# Patient Record
Sex: MALE | URBAN - METROPOLITAN AREA
[De-identification: names, ages, dates, MRNs, and addresses within clinical notes are randomized per-mention and may not be internally consistent; named-entity substitution may affect disease eponyms.]

---

## 2022-04-11 ENCOUNTER — WEB ENCOUNTER (OUTPATIENT)
Dept: URBAN - METROPOLITAN AREA CLINIC 82 | Facility: CLINIC | Age: 67
End: 2022-04-11

## 2022-04-11 ENCOUNTER — OFFICE VISIT (OUTPATIENT)
Dept: URBAN - METROPOLITAN AREA CLINIC 82 | Facility: CLINIC | Age: 67
End: 2022-04-11
Payer: MEDICARE

## 2022-04-11 DIAGNOSIS — K74.60 UNSPECIFIED CIRRHOSIS OF LIVER: ICD-10-CM

## 2022-04-11 DIAGNOSIS — R18.8 OTHER ASCITES: ICD-10-CM

## 2022-04-11 DIAGNOSIS — Z95.810 CARDIAC DEFIBRILLATOR IN PLACE: ICD-10-CM

## 2022-04-11 DIAGNOSIS — Z79.01 ANTICOAGULANT LONG-TERM USE: ICD-10-CM

## 2022-04-11 PROBLEM — 711150003: Status: ACTIVE | Noted: 2022-04-11

## 2022-04-11 PROBLEM — 389026000: Status: ACTIVE | Noted: 2022-04-11

## 2022-04-11 PROBLEM — 441769002: Status: ACTIVE | Noted: 2022-04-11

## 2022-04-11 PROCEDURE — 99204 OFFICE O/P NEW MOD 45 MIN: CPT | Performed by: INTERNAL MEDICINE

## 2022-04-11 RX ORDER — CLOPIDOGREL 75 MG/1
1 TABLET TABLET, FILM COATED ORAL ONCE A DAY
Status: ACTIVE | COMMUNITY

## 2022-04-11 RX ORDER — TAMSULOSIN HYDROCHLORIDE 0.4 MG/1
1 CAPSULE CAPSULE ORAL ONCE A DAY
Status: ACTIVE | COMMUNITY

## 2022-04-11 RX ORDER — EMPAGLIFLOZIN 10 MG/1
1 TABLET TABLET, FILM COATED ORAL ONCE A DAY
Status: ACTIVE | COMMUNITY

## 2022-04-11 RX ORDER — AMIODARONE HYDROCHLORIDE 200 MG/1
1 TABLET TABLET ORAL ONCE A DAY
Status: ACTIVE | COMMUNITY

## 2022-04-11 RX ORDER — CARVEDILOL 3.12 MG/1
1 TABLET WITH FOOD TABLET, FILM COATED ORAL TWICE A DAY
Status: ACTIVE | COMMUNITY

## 2022-04-11 NOTE — HPI-TODAY'S VISIT:
reports hx cryptogenic cirrhosis, denies etoh, recently prescribed ?lasix 20 daily for ascites and abd distention. no record available or med list. reports hx colon polyp ?5y ago. recent Chest CT, NS [facility], record obtained, reviewed, sm R pleural effusion, s/p ICD, new upper abd ascites and equivocal changes of cirrhosis. had been on plavix as of 4 mo ago, and omep 20 daily.

## 2022-04-14 LAB
A/G RATIO: 1.7
ACTIN (SMOOTH MUSCLE) ANTIBODY: 13
ALBUMIN: 4.2
ALKALINE PHOSPHATASE: 137
ALPHA-1-ANTITRYPSIN, SERUM: 203
ALT (SGPT): 9
AST (SGOT): 17
BASO (ABSOLUTE): 0.1
BASOS: 1
BILIRUBIN, DIRECT: 0.5
BILIRUBIN, TOTAL: 1.5
BUN/CREATININE RATIO: 12
BUN: 13
CALCIUM: 9
CARBON DIOXIDE, TOTAL: 22
CERULOPLASMIN: 39.5
CHLORIDE: 106
CREATININE: 1.09
EGFR: 74
EOS (ABSOLUTE): 0.2
EOS: 2
FERRITIN, SERUM: 16
GLOBULIN, TOTAL: 2.5
GLUCOSE: 111
HBSAG SCREEN: NEGATIVE
HCV AB: <0.1
HEMATOCRIT: 39.2
HEMATOLOGY COMMENTS:: (no result)
HEMOGLOBIN: 10.2
HEP A AB, TOTAL: NEGATIVE
HEP B CORE AB, TOT: NEGATIVE
HEPATITIS B SURF AB QUANT: <3.1
IMMATURE CELLS: (no result)
IMMATURE GRANS (ABS): 0
IMMATURE GRANULOCYTES: 1
INR: 1.1
INTERPRETATION:: (no result)
IRON BIND.CAP.(TIBC): 483
IRON SATURATION: 5
IRON: 25
LYMPHS (ABSOLUTE): 1.1
LYMPHS: 13
MCH: 18.1
MCHC: 26
MCV: 69
MITOCHONDRIAL (M2) ANTIBODY: <20
MONOCYTES(ABSOLUTE): 0.6
MONOCYTES: 7
NEUTROPHILS (ABSOLUTE): 6.5
NEUTROPHILS: 76
NRBC: (no result)
PLATELETS: 303
POTASSIUM: 4.6
PROTEIN, TOTAL: 6.7
PROTHROMBIN TIME: 11.7
RBC: 5.65
RDW: 20.4
SODIUM: 143
UIBC: 458
WBC: 8.5

## 2022-04-18 ENCOUNTER — TELEPHONE ENCOUNTER (OUTPATIENT)
Dept: URBAN - METROPOLITAN AREA CLINIC 82 | Facility: CLINIC | Age: 67
End: 2022-04-18

## 2022-04-20 ENCOUNTER — TELEPHONE ENCOUNTER (OUTPATIENT)
Dept: URBAN - METROPOLITAN AREA CLINIC 82 | Facility: CLINIC | Age: 67
End: 2022-04-20

## 2022-04-20 ENCOUNTER — TELEPHONE ENCOUNTER (OUTPATIENT)
Dept: URBAN - METROPOLITAN AREA CLINIC 92 | Facility: CLINIC | Age: 67
End: 2022-04-20

## 2022-04-20 RX ORDER — CLOPIDOGREL 75 MG/1
1 TABLET TABLET, FILM COATED ORAL ONCE A DAY
Status: ACTIVE | COMMUNITY

## 2022-04-20 RX ORDER — CARVEDILOL 3.12 MG/1
1 TABLET WITH FOOD TABLET, FILM COATED ORAL TWICE A DAY
Status: ACTIVE | COMMUNITY

## 2022-04-20 RX ORDER — AMIODARONE HYDROCHLORIDE 200 MG/1
1 TABLET TABLET ORAL ONCE A DAY
Status: ACTIVE | COMMUNITY

## 2022-04-20 RX ORDER — MINERAL SUPPLEMENT IRON 300 MG / 5 ML STRENGTH LIQUID 100 PER BOX UNFLAVORED 300 MG/5ML
10 ML LIQUID (ML) ORAL ONCE A DAY
Qty: 300 ML | Refills: 1 | OUTPATIENT

## 2022-04-20 RX ORDER — FERRIC CARBOXYMALTOSE INJECTION 50 MG/ML
AS DIRECTED INJECTION, SOLUTION INTRAVENOUS
Qty: 2 VIAL | Refills: 0 | OUTPATIENT
Start: 2022-04-20 | End: 2022-05-04

## 2022-04-20 RX ORDER — VITAMIN A 2400 MCG
1 TABLET CAPSULE ORAL TWICE DAILY
Qty: 60 | Refills: 1 | OUTPATIENT

## 2022-04-20 RX ORDER — TAMSULOSIN HYDROCHLORIDE 0.4 MG/1
1 CAPSULE CAPSULE ORAL ONCE A DAY
Status: ACTIVE | COMMUNITY

## 2022-04-20 RX ORDER — EMPAGLIFLOZIN 10 MG/1
1 TABLET TABLET, FILM COATED ORAL ONCE A DAY
Status: ACTIVE | COMMUNITY

## 2022-04-20 RX ORDER — MINERAL SUPPLEMENT IRON 300 MG / 5 ML STRENGTH LIQUID 100 PER BOX UNFLAVORED 300 MG/5ML
10 ML LIQUID (ML) ORAL ONCE A DAY
Qty: 300 ML | Refills: 1 | Status: ACTIVE | COMMUNITY

## 2022-04-20 RX ORDER — FERRIC CARBOXYMALTOSE INJECTION 50 MG/ML
AS DIRECTED INJECTION, SOLUTION INTRAVENOUS
Qty: 2 VIAL | Refills: 0 | Status: ACTIVE | COMMUNITY
Start: 2022-04-20 | End: 2022-05-04

## 2022-04-26 PROBLEM — 19943007: Status: ACTIVE | Noted: 2022-04-11

## 2022-05-03 ENCOUNTER — TELEPHONE ENCOUNTER (OUTPATIENT)
Dept: URBAN - METROPOLITAN AREA CLINIC 82 | Facility: CLINIC | Age: 67
End: 2022-05-03

## 2022-06-10 ENCOUNTER — OFFICE VISIT (OUTPATIENT)
Dept: URBAN - METROPOLITAN AREA MEDICAL CENTER 24 | Facility: MEDICAL CENTER | Age: 67
End: 2022-06-10
Payer: MEDICARE

## 2022-06-10 DIAGNOSIS — K74.60 ADVANCED CIRRHOSIS: ICD-10-CM

## 2022-06-10 DIAGNOSIS — K29.60 ADENOPAPILLOMATOSIS GASTRICA: ICD-10-CM

## 2022-06-10 DIAGNOSIS — Z86.010 ADENOMAS PERSONAL HISTORY OF COLONIC POLYPS: ICD-10-CM

## 2022-06-10 DIAGNOSIS — D12.2 ADENOMA OF ASCENDING COLON: ICD-10-CM

## 2022-06-10 DIAGNOSIS — D50.9 ANEMIA: ICD-10-CM

## 2022-06-10 PROCEDURE — 43239 EGD BIOPSY SINGLE/MULTIPLE: CPT | Performed by: INTERNAL MEDICINE

## 2022-06-10 PROCEDURE — 45385 COLONOSCOPY W/LESION REMOVAL: CPT | Performed by: INTERNAL MEDICINE

## 2022-06-29 ENCOUNTER — OFFICE VISIT (OUTPATIENT)
Dept: URBAN - METROPOLITAN AREA CLINIC 82 | Facility: CLINIC | Age: 67
End: 2022-06-29
Payer: MEDICARE

## 2022-06-29 ENCOUNTER — OFFICE VISIT (OUTPATIENT)
Dept: URBAN - METROPOLITAN AREA CLINIC 81 | Facility: CLINIC | Age: 67
End: 2022-06-29
Payer: MEDICARE

## 2022-06-29 VITALS
SYSTOLIC BLOOD PRESSURE: 109 MMHG | WEIGHT: 194 LBS | DIASTOLIC BLOOD PRESSURE: 66 MMHG | TEMPERATURE: 97.3 F | HEART RATE: 55 BPM | HEIGHT: 68 IN | BODY MASS INDEX: 29.4 KG/M2

## 2022-06-29 DIAGNOSIS — D50.0 IRON DEFICIENCY ANEMIA DUE TO CHRONIC BLOOD LOSS: ICD-10-CM

## 2022-06-29 DIAGNOSIS — K74.60 CIRRHOSIS OF LIVER WITHOUT ASCITES, UNSPECIFIED HEPATIC CIRRHOSIS TYPE: ICD-10-CM

## 2022-06-29 DIAGNOSIS — Z23 COVID-19 VACCINE ADMINISTERED: ICD-10-CM

## 2022-06-29 PROBLEM — 724556004: Status: ACTIVE | Noted: 2022-04-20

## 2022-06-29 PROCEDURE — 99213 OFFICE O/P EST LOW 20 MIN: CPT | Performed by: INTERNAL MEDICINE

## 2022-06-29 PROCEDURE — 90471 IMMUNIZATION ADMIN: CPT | Performed by: INTERNAL MEDICINE

## 2022-06-29 PROCEDURE — 90636 HEP A/HEP B VACC ADULT IM: CPT | Performed by: INTERNAL MEDICINE

## 2022-06-29 PROCEDURE — G0010 ADMIN HEPATITIS B VACCINE: HCPCS | Performed by: INTERNAL MEDICINE

## 2022-06-29 RX ORDER — AMIODARONE HYDROCHLORIDE 200 MG/1
1 TABLET TABLET ORAL ONCE A DAY
Status: ACTIVE | COMMUNITY

## 2022-06-29 RX ORDER — VITAMIN A 2400 MCG
1 TABLET CAPSULE ORAL TWICE DAILY
Qty: 60 | Refills: 1 | Status: ACTIVE | COMMUNITY

## 2022-06-29 RX ORDER — TAMSULOSIN HYDROCHLORIDE 0.4 MG/1
1 CAPSULE CAPSULE ORAL ONCE A DAY
Status: ACTIVE | COMMUNITY

## 2022-06-29 RX ORDER — MINERAL SUPPLEMENT IRON 300 MG / 5 ML STRENGTH LIQUID 100 PER BOX UNFLAVORED 300 MG/5ML
10 ML LIQUID (ML) ORAL ONCE A DAY
Qty: 300 ML | Refills: 1 | Status: ACTIVE | COMMUNITY

## 2022-06-29 RX ORDER — CLOPIDOGREL 75 MG/1
1 TABLET TABLET, FILM COATED ORAL ONCE A DAY
Status: ACTIVE | COMMUNITY

## 2022-06-29 RX ORDER — CARVEDILOL 3.12 MG/1
1 TABLET WITH FOOD TABLET, FILM COATED ORAL TWICE A DAY
Status: ACTIVE | COMMUNITY

## 2022-06-29 RX ORDER — EMPAGLIFLOZIN 10 MG/1
1 TABLET TABLET, FILM COATED ORAL ONCE A DAY
Status: ACTIVE | COMMUNITY

## 2022-06-29 NOTE — HPI-TODAY'S VISIT:
RUQ U/S 4/2022 w hepatosplenomegaly, small ascites. EGD '22 w gastritis, no hpylori, no varices. Colonoscopy '22 w int hemorrhoids, diverticulosis, small adenoma, rec repeat in 5 years. s/p iron infusions, hgb 10.2. Prior hx: reports hx cryptogenic cirrhosis, denies etoh, off lasix 20 daily for ascites and abd distention. no record available or med list. reports hx colon polyp ?5y ago. recent Chest CT, [facility], record obtained, reviewed, sm R pleural effusion, s/p ICD, new upper abd ascites and equivocal changes of cirrhosis. omep 20 daily.

## 2022-06-30 LAB
A/G RATIO: 1.8
ALBUMIN: 4.4
ALKALINE PHOSPHATASE: 165
ALT (SGPT): 22
AST (SGOT): 26
BASO (ABSOLUTE): 0.1
BASOS: 1
BILIRUBIN, TOTAL: 1.1
BUN/CREATININE RATIO: 10
BUN: 9
CALCIUM: 9.5
CARBON DIOXIDE, TOTAL: 24
CHLORIDE: 104
CREATININE: 0.91
EGFR: 92
EOS (ABSOLUTE): 0.4
EOS: 4
FERRITIN, SERUM: 316
GLOBULIN, TOTAL: 2.4
GLUCOSE: 103
HEMATOCRIT: 50.4
HEMATOLOGY COMMENTS:: (no result)
HEMOGLOBIN: 14.9
IMMATURE CELLS: (no result)
IMMATURE GRANS (ABS): 0
IMMATURE GRANULOCYTES: 0
INR: 1.1
IRON BIND.CAP.(TIBC): 333
IRON SATURATION: 15
IRON: 50
LYMPHS (ABSOLUTE): 1
LYMPHS: 12
MCH: 23.4
MCHC: 29.6
MCV: 79
MONOCYTES(ABSOLUTE): 0.5
MONOCYTES: 6
NEUTROPHILS (ABSOLUTE): 6.4
NEUTROPHILS: 77
NRBC: (no result)
PLATELETS: 252
POTASSIUM: 4.8
PROTEIN, TOTAL: 6.8
PROTHROMBIN TIME: 11.1
RBC: 6.38
RDW: 21.4
SODIUM: 142
UIBC: 283
WBC: 8.3

## 2022-07-01 ENCOUNTER — TELEPHONE ENCOUNTER (OUTPATIENT)
Dept: URBAN - METROPOLITAN AREA CLINIC 82 | Facility: CLINIC | Age: 67
End: 2022-07-01

## 2022-07-06 PROBLEM — 19943007: Status: ACTIVE | Noted: 2022-06-29

## 2022-07-29 ENCOUNTER — TELEPHONE ENCOUNTER (OUTPATIENT)
Dept: URBAN - METROPOLITAN AREA CLINIC 82 | Facility: CLINIC | Age: 67
End: 2022-07-29

## 2022-07-29 ENCOUNTER — OFFICE VISIT (OUTPATIENT)
Dept: URBAN - METROPOLITAN AREA CLINIC 81 | Facility: CLINIC | Age: 67
End: 2022-07-29

## 2023-02-27 ENCOUNTER — OFFICE VISIT (OUTPATIENT)
Dept: URBAN - METROPOLITAN AREA CLINIC 82 | Facility: CLINIC | Age: 68
End: 2023-02-27
Payer: MEDICARE

## 2023-02-27 VITALS
WEIGHT: 199 LBS | HEIGHT: 68 IN | DIASTOLIC BLOOD PRESSURE: 59 MMHG | BODY MASS INDEX: 30.16 KG/M2 | TEMPERATURE: 98.4 F | HEART RATE: 63 BPM | SYSTOLIC BLOOD PRESSURE: 101 MMHG

## 2023-02-27 DIAGNOSIS — R10.33 PERIUMBILICAL PAIN: ICD-10-CM

## 2023-02-27 DIAGNOSIS — K74.69 CRYPTOGENIC CIRRHOSIS: ICD-10-CM

## 2023-02-27 PROBLEM — 89580002: Status: ACTIVE | Noted: 2023-02-27

## 2023-02-27 PROCEDURE — 99213 OFFICE O/P EST LOW 20 MIN: CPT | Performed by: INTERNAL MEDICINE

## 2023-02-27 RX ORDER — VITAMIN A 2400 MCG
1 TABLET CAPSULE ORAL TWICE DAILY
Qty: 60 | Refills: 1 | Status: DISCONTINUED | COMMUNITY

## 2023-02-27 RX ORDER — MINERAL SUPPLEMENT IRON 300 MG / 5 ML STRENGTH LIQUID 100 PER BOX UNFLAVORED 300 MG/5ML
10 ML LIQUID (ML) ORAL ONCE A DAY
Qty: 300 ML | Refills: 1 | Status: DISCONTINUED | COMMUNITY

## 2023-02-27 RX ORDER — CLOPIDOGREL 75 MG/1
1 TABLET TABLET, FILM COATED ORAL ONCE A DAY
Status: ACTIVE | COMMUNITY

## 2023-02-27 RX ORDER — TAMSULOSIN HYDROCHLORIDE 0.4 MG/1
1 CAPSULE CAPSULE ORAL ONCE A DAY
Status: ACTIVE | COMMUNITY

## 2023-02-27 RX ORDER — CARVEDILOL 3.12 MG/1
1 TABLET WITH FOOD TABLET, FILM COATED ORAL TWICE A DAY
Status: ACTIVE | COMMUNITY

## 2023-02-27 RX ORDER — AMIODARONE HYDROCHLORIDE 200 MG/1
1 TABLET TABLET ORAL ONCE A DAY
Status: ACTIVE | COMMUNITY

## 2023-02-27 RX ORDER — EMPAGLIFLOZIN 10 MG/1
1 TABLET TABLET, FILM COATED ORAL ONCE A DAY
Status: ACTIVE | COMMUNITY

## 2023-02-27 NOTE — HPI-TODAY'S VISIT:
R periumb pains, intermittent. hx spinal stenosis. bending can worsen. on PPI daily, occ nsaids. Prior hx: RUQ U/S 4/2022 w hepatosplenomegaly, small ascites. EGD '22 w gastritis, no hpylori, no varices. Colonoscopy '22 w int hemorrhoids, diverticulosis, small adenoma, rec repeat in 5 years. s/p iron infusions, hgb 10.2. Prior hx: reports hx cryptogenic cirrhosis, denies etoh.  recent Chest CT, [facility], sm R pleural effusion, s/p ICD, new upper abd ascites and equivocal changes of cirrhosis. omep 20 daily.

## 2023-02-28 LAB
A/G RATIO: 1.7
ALBUMIN: 4.4
ALKALINE PHOSPHATASE: 103
ALT (SGPT): 37
AST (SGOT): 32
BILIRUBIN, TOTAL: 0.9
BUN/CREATININE RATIO: (no result)
BUN: 11
CALCIUM: 9.5
CARBON DIOXIDE, TOTAL: 25
CHLORIDE: 105
CREATININE: 0.89
EGFR: 93
FERRITIN, SERUM: 211
GLOBULIN, TOTAL: 2.6
GLUCOSE: 89
HEMATOCRIT: 48.4
HEMOGLOBIN: 16.1
IMMUNOGLOBULIN A, QN, SERUM: 206
INR: 1
INTERPRETATION: (no result)
IRON BIND.CAP.(TIBC): 342
IRON SATURATION: 17
IRON: 58
LIPASE: 21
MCH: 27.7
MCHC: 33.3
MCV: 83.2
MPV: 11.4
PLATELET COUNT: 221
POTASSIUM: 4.2
PROTEIN, TOTAL: 7
PT: 10.5
RDW: 13.6
RED BLOOD CELL COUNT: 5.82
SODIUM: 144
T-TRANSGLUTAMINASE (TTG) IGA: <1
WHITE BLOOD CELL COUNT: 9.1

## 2023-04-03 ENCOUNTER — OFFICE VISIT (OUTPATIENT)
Dept: URBAN - METROPOLITAN AREA CLINIC 82 | Facility: CLINIC | Age: 68
End: 2023-04-03
Payer: MEDICARE

## 2023-04-03 ENCOUNTER — LAB OUTSIDE AN ENCOUNTER (OUTPATIENT)
Dept: URBAN - METROPOLITAN AREA CLINIC 82 | Facility: CLINIC | Age: 68
End: 2023-04-03

## 2023-04-03 VITALS
SYSTOLIC BLOOD PRESSURE: 110 MMHG | TEMPERATURE: 97.2 F | HEIGHT: 68 IN | DIASTOLIC BLOOD PRESSURE: 62 MMHG | BODY MASS INDEX: 30.22 KG/M2 | WEIGHT: 199.4 LBS | HEART RATE: 52 BPM

## 2023-04-03 DIAGNOSIS — K74.60 CIRRHOSIS OF LIVER WITHOUT ASCITES, UNSPECIFIED HEPATIC CIRRHOSIS TYPE: ICD-10-CM

## 2023-04-03 DIAGNOSIS — R93.3 ABNORMAL COMPUTED TOMOGRAPHY OF SMALL INTESTINE: ICD-10-CM

## 2023-04-03 DIAGNOSIS — K52.9 ILEITIS: ICD-10-CM

## 2023-04-03 DIAGNOSIS — Z79.01 ANTICOAGULANT LONG-TERM USE: ICD-10-CM

## 2023-04-03 PROCEDURE — 99214 OFFICE O/P EST MOD 30 MIN: CPT | Performed by: INTERNAL MEDICINE

## 2023-04-03 RX ORDER — EMPAGLIFLOZIN 10 MG/1
1 TABLET TABLET, FILM COATED ORAL ONCE A DAY
Status: ACTIVE | COMMUNITY

## 2023-04-03 RX ORDER — AMIODARONE HYDROCHLORIDE 200 MG/1
1 TABLET TABLET ORAL ONCE A DAY
Status: ACTIVE | COMMUNITY

## 2023-04-03 RX ORDER — CLOPIDOGREL 75 MG/1
1 TABLET TABLET, FILM COATED ORAL ONCE A DAY
Status: ACTIVE | COMMUNITY

## 2023-04-03 RX ORDER — TAMSULOSIN HYDROCHLORIDE 0.4 MG/1
1 CAPSULE CAPSULE ORAL ONCE A DAY
Status: ACTIVE | COMMUNITY

## 2023-04-03 RX ORDER — CARVEDILOL 3.12 MG/1
1 TABLET WITH FOOD TABLET, FILM COATED ORAL TWICE A DAY
Status: ACTIVE | COMMUNITY

## 2023-04-03 NOTE — HPI-TODAY'S VISIT:
Pain resolved. Occ loose stool, only if drinks coffee, otherwise nml. CT abd/pel w concern for distal and TI inflammation, cirrhotic liver. Normal Cr/Bili/INR. Prior hx: R periumb pains, intermittent. hx spinal stenosis. bending can worsen. on PPI daily, occ nsaids. Prior hx: RUQ U/S 4/2022 w hepatosplenomegaly, small ascites. EGD '22 w gastritis, no hpylori, no varices. Colonoscopy '22 w int hemorrhoids, diverticulosis, small adenoma, rec repeat in 5 years. s/p iron infusions, hgb 10.2. Prior hx: reports hx cryptogenic cirrhosis, denies etoh.  recent Chest CT, [facility], sm R pleural effusion, s/p ICD, new upper abd ascites and equivocal changes of cirrhosis. omep 20 daily.

## 2023-04-19 ENCOUNTER — TELEPHONE ENCOUNTER (OUTPATIENT)
Dept: URBAN - METROPOLITAN AREA CLINIC 82 | Facility: CLINIC | Age: 68
End: 2023-04-19

## 2023-04-24 ENCOUNTER — OFFICE VISIT (OUTPATIENT)
Dept: URBAN - METROPOLITAN AREA CLINIC 82 | Facility: CLINIC | Age: 68
End: 2023-04-24

## 2023-04-24 RX ORDER — CLOPIDOGREL 75 MG/1
1 TABLET TABLET, FILM COATED ORAL ONCE A DAY
COMMUNITY

## 2023-04-24 RX ORDER — EMPAGLIFLOZIN 10 MG/1
1 TABLET TABLET, FILM COATED ORAL ONCE A DAY
COMMUNITY

## 2023-04-24 RX ORDER — CARVEDILOL 3.12 MG/1
1 TABLET WITH FOOD TABLET, FILM COATED ORAL TWICE A DAY
COMMUNITY

## 2023-04-24 RX ORDER — AMIODARONE HYDROCHLORIDE 200 MG/1
1 TABLET TABLET ORAL ONCE A DAY
COMMUNITY

## 2023-04-24 RX ORDER — TAMSULOSIN HYDROCHLORIDE 0.4 MG/1
1 CAPSULE CAPSULE ORAL ONCE A DAY
COMMUNITY

## 2023-05-17 ENCOUNTER — WEB ENCOUNTER (OUTPATIENT)
Age: 68
End: 2023-05-17

## 2023-05-17 ENCOUNTER — OFFICE VISIT (OUTPATIENT)
Dept: URBAN - METROPOLITAN AREA MEDICAL CENTER 24 | Facility: MEDICAL CENTER | Age: 68
End: 2023-05-17

## 2023-05-17 ENCOUNTER — CLAIMS CREATED FROM THE CLAIM WINDOW (OUTPATIENT)
Dept: URBAN - METROPOLITAN AREA MEDICAL CENTER 24 | Facility: MEDICAL CENTER | Age: 68
End: 2023-05-17
Payer: MEDICARE

## 2023-05-17 DIAGNOSIS — K63.3 APHTHOUS ULCER OF COLON: ICD-10-CM

## 2023-05-17 DIAGNOSIS — K52.89 (LYMPHOCYTIC) MICROSCOPIC COLITIS: ICD-10-CM

## 2023-05-17 PROCEDURE — 45380 COLONOSCOPY AND BIOPSY: CPT | Performed by: INTERNAL MEDICINE

## 2023-05-17 RX ORDER — EMPAGLIFLOZIN 10 MG/1
1 TABLET TABLET, FILM COATED ORAL ONCE A DAY
COMMUNITY

## 2023-05-17 RX ORDER — TAMSULOSIN HYDROCHLORIDE 0.4 MG/1
1 CAPSULE CAPSULE ORAL ONCE A DAY
COMMUNITY

## 2023-05-17 RX ORDER — CLOPIDOGREL 75 MG/1
1 TABLET TABLET, FILM COATED ORAL ONCE A DAY
COMMUNITY

## 2023-05-17 RX ORDER — PREDNISONE 20 MG/1
2 TABLETS TABLET ORAL ONCE A DAY
Qty: 60 TABLET | Refills: 1 | OUTPATIENT
Start: 2023-05-17

## 2023-05-17 RX ORDER — PREDNISONE 20 MG/1
2 TABLET TABLET ORAL ONCE A DAY
Qty: 60 TABLET | Refills: 1 | OUTPATIENT
Start: 2023-05-18 | End: 2023-06-17

## 2023-05-17 RX ORDER — CARVEDILOL 3.12 MG/1
1 TABLET WITH FOOD TABLET, FILM COATED ORAL TWICE A DAY
COMMUNITY

## 2023-05-17 RX ORDER — AMIODARONE HYDROCHLORIDE 200 MG/1
1 TABLET TABLET ORAL ONCE A DAY
COMMUNITY

## 2023-06-14 ENCOUNTER — OFFICE VISIT (OUTPATIENT)
Dept: URBAN - METROPOLITAN AREA CLINIC 82 | Facility: CLINIC | Age: 68
End: 2023-06-14
Payer: MEDICARE

## 2023-06-14 ENCOUNTER — TELEPHONE ENCOUNTER (OUTPATIENT)
Dept: URBAN - METROPOLITAN AREA CLINIC 97 | Facility: CLINIC | Age: 68
End: 2023-06-14

## 2023-06-14 VITALS
HEIGHT: 68 IN | DIASTOLIC BLOOD PRESSURE: 72 MMHG | TEMPERATURE: 98.2 F | HEART RATE: 108 BPM | WEIGHT: 200.4 LBS | BODY MASS INDEX: 30.37 KG/M2 | SYSTOLIC BLOOD PRESSURE: 119 MMHG

## 2023-06-14 DIAGNOSIS — K50.00 CROHN'S DISEASE OF SMALL INTESTINE WITHOUT COMPLICATION: ICD-10-CM

## 2023-06-14 DIAGNOSIS — K74.69 CRYPTOGENIC CIRRHOSIS: ICD-10-CM

## 2023-06-14 PROBLEM — 56689002: Status: ACTIVE | Noted: 2023-06-14

## 2023-06-14 PROCEDURE — 99213 OFFICE O/P EST LOW 20 MIN: CPT | Performed by: INTERNAL MEDICINE

## 2023-06-14 RX ORDER — PREDNISONE 20 MG/1
2 TABLETS TABLET ORAL ONCE A DAY
Qty: 60 TABLET | Refills: 1 | Status: ACTIVE | COMMUNITY
Start: 2023-05-17

## 2023-06-14 RX ORDER — EMPAGLIFLOZIN 10 MG/1
1 TABLET TABLET, FILM COATED ORAL ONCE A DAY
COMMUNITY

## 2023-06-14 RX ORDER — USTEKINUMAB 130 MG/26ML
AS DIRECTED SOLUTION INTRAVENOUS ONCE
Qty: 520 MILLIGRAMS | Refills: 0 | OUTPATIENT
Start: 2023-06-14 | End: 2023-06-15

## 2023-06-14 RX ORDER — USTEKINUMAB 130 MG/26ML
AS DIRECTED SOLUTION INTRAVENOUS ONCE
Qty: 390 MILLIGRAMS | Refills: 0 | OUTPATIENT
Start: 2023-06-14 | End: 2023-06-15

## 2023-06-14 RX ORDER — CARVEDILOL 3.12 MG/1
1 TABLET WITH FOOD TABLET, FILM COATED ORAL TWICE A DAY
COMMUNITY

## 2023-06-14 RX ORDER — PREDNISONE 10 MG/1
AS DIRECTED, 40MG DAILY 1 WEEK, 30MG DAILY 1 WK, 20MG DAILY 1WK, 10MG DAILY 1 WK TABLET ORAL ONCE A DAY
Qty: 70 | Refills: 0 | OUTPATIENT
Start: 2023-06-14 | End: 2023-07-14

## 2023-06-14 RX ORDER — CLOPIDOGREL 75 MG/1
1 TABLET TABLET, FILM COATED ORAL ONCE A DAY
COMMUNITY

## 2023-06-14 RX ORDER — TAMSULOSIN HYDROCHLORIDE 0.4 MG/1
1 CAPSULE CAPSULE ORAL ONCE A DAY
COMMUNITY

## 2023-06-14 RX ORDER — AMIODARONE HYDROCHLORIDE 200 MG/1
1 TABLET TABLET ORAL ONCE A DAY
COMMUNITY

## 2023-06-14 RX ORDER — PREDNISONE 20 MG/1
2 TABLET TABLET ORAL ONCE A DAY
Qty: 60 TABLET | Refills: 1 | Status: ACTIVE | COMMUNITY
Start: 2023-05-18 | End: 2023-06-17

## 2023-06-14 RX ORDER — USTEKINUMAB 90 MG/ML
90 MG INJECTION, SOLUTION SUBCUTANEOUS
Qty: 1 | Refills: 6 | OUTPATIENT
Start: 2023-06-14

## 2023-06-14 NOTE — HPI-TODAY'S VISIT:
Colonoscopy '23 w moderate chronic crohns ileitis, normal colon mucosa. Prednisone 40mg daily, has felt well. Prior hx: Pain resolved. Occ loose stool, only if drinks coffee, otherwise nml. CT abd/pel w concern for distal and TI inflammation, cirrhotic liver. Normal Cr/Bili/INR. Prior hx: R periumb pains, intermittent. hx spinal stenosis. bending can worsen. on PPI daily, occ nsaids. Prior hx: RUQ U/S 4/2022 w hepatosplenomegaly, small ascites. EGD '22 w gastritis, no hpylori, no varices. Colonoscopy '22 w int hemorrhoids, diverticulosis, small adenoma, rec repeat in 5 years. s/p iron infusions, hgb 10.2. Prior hx: reports hx cryptogenic cirrhosis, denies etoh.  recent Chest CT, [facility], sm R pleural effusion, s/p ICD, new upper abd ascites and equivocal changes of cirrhosis. omep 20 daily.

## 2023-06-15 ENCOUNTER — TELEPHONE ENCOUNTER (OUTPATIENT)
Dept: URBAN - METROPOLITAN AREA CLINIC 97 | Facility: CLINIC | Age: 68
End: 2023-06-15

## 2023-06-20 LAB
A/G RATIO: 1.7
ALBUMIN: 4.3
ALKALINE PHOSPHATASE: 99
ALT (SGPT): 58
AST (SGOT): 22
BILIRUBIN, TOTAL: 1
BUN/CREATININE RATIO: (no result)
BUN: 11
C-REACTIVE PROTEIN, QUANT: 21.6
CALCIUM: 9.3
CARBON DIOXIDE, TOTAL: 27
CHLORIDE: 99
CREATININE: 0.77
EGFR: 98
GLOBULIN, TOTAL: 2.6
GLUCOSE: 189
HEMATOCRIT: 55.2
HEMOGLOBIN: 17.9
INR: 1
MCH: 27.8
MCHC: 32.4
MCV: 85.6
MITOGEN-NIL: 1.04
MPV: 11.4
PLATELET COUNT: 169
POTASSIUM: 4.4
PROTEIN, TOTAL: 6.9
PT: 10.4
QUANTIFERON NIL VALUE: 0.02
QUANTIFERON TB1 AG VALUE: 0
QUANTIFERON TB2 AG VALUE: 0
QUANTIFERON-TB GOLD PLUS: NEGATIVE
RDW: 14.6
RED BLOOD CELL COUNT: 6.45
SODIUM: 139
WHITE BLOOD CELL COUNT: 11.7

## 2023-06-22 ENCOUNTER — TELEPHONE ENCOUNTER (OUTPATIENT)
Dept: URBAN - METROPOLITAN AREA CLINIC 97 | Facility: CLINIC | Age: 68
End: 2023-06-22

## 2023-07-05 ENCOUNTER — TELEPHONE ENCOUNTER (OUTPATIENT)
Dept: URBAN - METROPOLITAN AREA CLINIC 97 | Facility: CLINIC | Age: 68
End: 2023-07-05

## 2023-07-12 ENCOUNTER — TELEPHONE ENCOUNTER (OUTPATIENT)
Dept: URBAN - METROPOLITAN AREA CLINIC 115 | Facility: CLINIC | Age: 68
End: 2023-07-12

## 2023-07-13 ENCOUNTER — TELEPHONE ENCOUNTER (OUTPATIENT)
Dept: URBAN - METROPOLITAN AREA CLINIC 115 | Facility: CLINIC | Age: 68
End: 2023-07-13

## 2023-07-17 ENCOUNTER — TELEPHONE ENCOUNTER (OUTPATIENT)
Dept: URBAN - METROPOLITAN AREA CLINIC 114 | Facility: CLINIC | Age: 68
End: 2023-07-17

## 2023-07-17 ENCOUNTER — OFFICE VISIT (OUTPATIENT)
Dept: URBAN - METROPOLITAN AREA CLINIC 114 | Facility: CLINIC | Age: 68
End: 2023-07-17
Payer: MEDICARE

## 2023-07-17 VITALS
RESPIRATION RATE: 20 BRPM | TEMPERATURE: 98.4 F | DIASTOLIC BLOOD PRESSURE: 59 MMHG | SYSTOLIC BLOOD PRESSURE: 134 MMHG | BODY MASS INDEX: 29.77 KG/M2 | HEART RATE: 58 BPM | WEIGHT: 196.4 LBS | HEIGHT: 68 IN

## 2023-07-17 DIAGNOSIS — K50.80 CROHN'S COLITIS: ICD-10-CM

## 2023-07-17 PROCEDURE — 96365 THER/PROPH/DIAG IV INF INIT: CPT | Performed by: INTERNAL MEDICINE

## 2023-07-17 RX ORDER — TAMSULOSIN HYDROCHLORIDE 0.4 MG/1
1 CAPSULE CAPSULE ORAL ONCE A DAY
COMMUNITY

## 2023-07-17 RX ORDER — EMPAGLIFLOZIN 10 MG/1
1 TABLET TABLET, FILM COATED ORAL ONCE A DAY
COMMUNITY

## 2023-07-17 RX ORDER — AMIODARONE HYDROCHLORIDE 200 MG/1
1 TABLET TABLET ORAL ONCE A DAY
COMMUNITY

## 2023-07-17 RX ORDER — CLOPIDOGREL 75 MG/1
1 TABLET TABLET, FILM COATED ORAL ONCE A DAY
COMMUNITY

## 2023-07-17 RX ORDER — CARVEDILOL 3.12 MG/1
1 TABLET WITH FOOD TABLET, FILM COATED ORAL TWICE A DAY
COMMUNITY

## 2023-07-17 RX ORDER — USTEKINUMAB 90 MG/ML
90 MG INJECTION, SOLUTION SUBCUTANEOUS
Qty: 1 | Refills: 6 | Status: ACTIVE | COMMUNITY
Start: 2023-06-14

## 2023-07-17 RX ORDER — PREDNISONE 20 MG/1
2 TABLETS TABLET ORAL ONCE A DAY
Qty: 60 TABLET | Refills: 1 | Status: ACTIVE | COMMUNITY
Start: 2023-05-17

## 2023-07-31 ENCOUNTER — TELEPHONE ENCOUNTER (OUTPATIENT)
Dept: URBAN - METROPOLITAN AREA CLINIC 115 | Facility: CLINIC | Age: 68
End: 2023-07-31

## 2023-07-31 RX ORDER — USTEKINUMAB 90 MG/ML
90 MG INJECTION, SOLUTION SUBCUTANEOUS
Qty: 1 | Refills: 6
Start: 2023-06-14 | End: 2024-09-25

## 2023-08-16 ENCOUNTER — OFFICE VISIT (OUTPATIENT)
Dept: URBAN - METROPOLITAN AREA CLINIC 82 | Facility: CLINIC | Age: 68
End: 2023-08-16
Payer: MEDICARE

## 2023-08-16 ENCOUNTER — LAB OUTSIDE AN ENCOUNTER (OUTPATIENT)
Dept: URBAN - METROPOLITAN AREA CLINIC 82 | Facility: CLINIC | Age: 68
End: 2023-08-16

## 2023-08-16 VITALS
TEMPERATURE: 98.2 F | WEIGHT: 195 LBS | DIASTOLIC BLOOD PRESSURE: 70 MMHG | HEIGHT: 68 IN | SYSTOLIC BLOOD PRESSURE: 116 MMHG | BODY MASS INDEX: 29.55 KG/M2 | HEART RATE: 67 BPM

## 2023-08-16 DIAGNOSIS — K50.00 CROHN'S DISEASE OF SMALL INTESTINE WITHOUT COMPLICATION: ICD-10-CM

## 2023-08-16 DIAGNOSIS — K74.60 CIRRHOSIS OF LIVER WITHOUT ASCITES, UNSPECIFIED HEPATIC CIRRHOSIS TYPE: ICD-10-CM

## 2023-08-16 PROBLEM — 266435005: Status: ACTIVE | Noted: 2023-08-16

## 2023-08-16 PROCEDURE — 99213 OFFICE O/P EST LOW 20 MIN: CPT | Performed by: INTERNAL MEDICINE

## 2023-08-16 RX ORDER — AMIODARONE HYDROCHLORIDE 200 MG/1
1 TABLET TABLET ORAL ONCE A DAY
Status: DISCONTINUED | COMMUNITY

## 2023-08-16 RX ORDER — CARVEDILOL 3.12 MG/1
1 TABLET WITH FOOD TABLET, FILM COATED ORAL TWICE A DAY
Status: ACTIVE | COMMUNITY

## 2023-08-16 RX ORDER — PREDNISONE 20 MG/1
2 TABLETS TABLET ORAL ONCE A DAY
Qty: 60 TABLET | Refills: 1 | Status: ACTIVE | COMMUNITY
Start: 2023-05-17

## 2023-08-16 RX ORDER — EMPAGLIFLOZIN 10 MG/1
1 TABLET TABLET, FILM COATED ORAL ONCE A DAY
Status: ACTIVE | COMMUNITY

## 2023-08-16 RX ORDER — USTEKINUMAB 90 MG/ML
90 MG INJECTION, SOLUTION SUBCUTANEOUS
Qty: 1 | Refills: 6 | Status: ACTIVE | COMMUNITY
Start: 2023-06-14 | End: 2024-09-25

## 2023-08-16 RX ORDER — TAMSULOSIN HYDROCHLORIDE 0.4 MG/1
1 CAPSULE CAPSULE ORAL ONCE A DAY
Status: DISCONTINUED | COMMUNITY

## 2023-08-16 RX ORDER — CLOPIDOGREL 75 MG/1
1 TABLET TABLET, FILM COATED ORAL ONCE A DAY
Status: DISCONTINUED | COMMUNITY

## 2023-08-16 NOTE — HPI-TODAY'S VISIT:
Stelara infusion 7/2023, first injection arrived damaged didnt see contact to get another one. Colonoscopy '23 w moderate chronic crohns ileitis, normal colon mucosa. Prednisone 40mg daily, has felt well. Prior hx: Pain resolved. Occ loose stool, only if drinks coffee, otherwise nml. CT abd/pel w concern for distal and TI inflammation, cirrhotic liver. Normal Cr/Bili/INR. Prior hx: R periumb pains, intermittent. hx spinal stenosis. bending can worsen. on PPI daily, occ nsaids. Prior hx: RUQ U/S 4/2022 w hepatosplenomegaly, small ascites. EGD '22 w gastritis, no hpylori, no varices. Colonoscopy '22 w int hemorrhoids, diverticulosis, small adenoma, rec repeat in 5 years. s/p iron infusions, hgb 10.2. Prior hx: reports hx cryptogenic cirrhosis, denies etoh.  recent Chest CT, [facility], sm R pleural effusion, s/p ICD, new upper abd ascites and equivocal changes of cirrhosis. omep 20 daily.

## 2023-11-15 ENCOUNTER — OFFICE VISIT (OUTPATIENT)
Dept: URBAN - METROPOLITAN AREA CLINIC 82 | Facility: CLINIC | Age: 68
End: 2023-11-15

## 2023-11-15 RX ORDER — PREDNISONE 20 MG/1
2 TABLETS TABLET ORAL ONCE A DAY
Qty: 60 TABLET | Refills: 1 | COMMUNITY
Start: 2023-05-17

## 2023-11-15 RX ORDER — USTEKINUMAB 90 MG/ML
90 MG INJECTION, SOLUTION SUBCUTANEOUS
Qty: 1 | Refills: 6 | COMMUNITY
Start: 2023-06-14 | End: 2024-09-25

## 2023-11-15 RX ORDER — EMPAGLIFLOZIN 10 MG/1
1 TABLET TABLET, FILM COATED ORAL ONCE A DAY
COMMUNITY

## 2023-11-15 RX ORDER — CARVEDILOL 3.12 MG/1
1 TABLET WITH FOOD TABLET, FILM COATED ORAL TWICE A DAY
COMMUNITY

## 2024-01-17 ENCOUNTER — DASHBOARD ENCOUNTERS (OUTPATIENT)
Age: 69
End: 2024-01-17

## 2024-01-18 ENCOUNTER — LAB OUTSIDE AN ENCOUNTER (OUTPATIENT)
Dept: URBAN - METROPOLITAN AREA CLINIC 82 | Facility: CLINIC | Age: 69
End: 2024-01-18

## 2024-01-18 ENCOUNTER — OFFICE VISIT (OUTPATIENT)
Dept: URBAN - METROPOLITAN AREA CLINIC 82 | Facility: CLINIC | Age: 69
End: 2024-01-18
Payer: MEDICARE

## 2024-01-18 VITALS
HEART RATE: 71 BPM | BODY MASS INDEX: 29.4 KG/M2 | TEMPERATURE: 97 F | HEIGHT: 68 IN | DIASTOLIC BLOOD PRESSURE: 55 MMHG | WEIGHT: 194 LBS | SYSTOLIC BLOOD PRESSURE: 91 MMHG

## 2024-01-18 DIAGNOSIS — R19.4 CHANGE IN BOWEL HABITS: ICD-10-CM

## 2024-01-18 DIAGNOSIS — K74.60 CIRRHOSIS OF LIVER WITHOUT ASCITES, UNSPECIFIED HEPATIC CIRRHOSIS TYPE: ICD-10-CM

## 2024-01-18 DIAGNOSIS — I51.9 CARDIAC DISEASE: ICD-10-CM

## 2024-01-18 DIAGNOSIS — K50.00 CROHN'S DISEASE OF SMALL INTESTINE WITHOUT COMPLICATION: ICD-10-CM

## 2024-01-18 PROBLEM — 56265001: Status: ACTIVE | Noted: 2024-01-18

## 2024-01-18 PROCEDURE — 99214 OFFICE O/P EST MOD 30 MIN: CPT | Performed by: INTERNAL MEDICINE

## 2024-01-18 RX ORDER — PREDNISONE 20 MG/1
2 TABLETS TABLET ORAL ONCE A DAY
Qty: 60 TABLET | Refills: 1 | Status: DISCONTINUED | COMMUNITY
Start: 2023-05-17

## 2024-01-18 RX ORDER — USTEKINUMAB 90 MG/ML
90 MG INJECTION, SOLUTION SUBCUTANEOUS
Qty: 1 | Refills: 6
Start: 2023-06-14 | End: 2025-03-13

## 2024-01-18 RX ORDER — USTEKINUMAB 90 MG/ML
90 MG INJECTION, SOLUTION SUBCUTANEOUS
Qty: 1 | Refills: 6 | Status: DISCONTINUED | COMMUNITY
Start: 2023-06-14 | End: 2024-09-25

## 2024-01-18 RX ORDER — FINASTERIDE 5 MG/1
1 TABLET TABLET, FILM COATED ORAL ONCE A DAY
Status: ACTIVE | COMMUNITY

## 2024-01-18 RX ORDER — EMPAGLIFLOZIN 10 MG/1
1 TABLET TABLET, FILM COATED ORAL ONCE A DAY
Status: ACTIVE | COMMUNITY

## 2024-01-18 RX ORDER — ATORVASTATIN CALCIUM 40 MG/1
1 TABLET TABLET, FILM COATED ORAL ONCE A DAY
Status: ACTIVE | COMMUNITY

## 2024-01-18 RX ORDER — CARVEDILOL 3.12 MG/1
1 TABLET WITH FOOD TABLET, FILM COATED ORAL TWICE A DAY
Status: ACTIVE | COMMUNITY

## 2024-01-18 NOTE — HPI-TODAY'S VISIT:
alt loose and hard stools. Stelara infusion 7/2023, doesnt always receive stelara every 2 months with [facility], not sure if has to call in the refill every time. Prior hx: Colonoscopy '23 w moderate chronic crohns ileitis, normal colon mucosa. Prior hx: Pain resolved. Occ loose stool, only if drinks coffee, otherwise nml. CT abd/pel w concern for distal and TI inflammation, cirrhotic liver. Normal Cr/Bili/INR. Prior hx: R periumb pains, intermittent. hx spinal stenosis. bending can worsen. on PPI daily, occ nsaids. Prior hx: RUQ U/S 4/2022 w hepatosplenomegaly, small ascites. EGD '22 w gastritis, no hpylori, no varices. Colonoscopy '22 w int hemorrhoids, diverticulosis, small adenoma, rec repeat in 5 years. s/p iron infusions, hgb 10.2. Prior hx: reports hx cryptogenic cirrhosis, denies etoh.  recent Chest CT, [facility], sm R pleural effusion, s/p ICD, new upper abd ascites and equivocal changes of cirrhosis. omep 20 daily.

## 2024-01-20 LAB
A/G RATIO: 1.9
ALBUMIN: 4.4
ALKALINE PHOSPHATASE: 91
ALT (SGPT): 23
AST (SGOT): 19
BILIRUBIN, TOTAL: 0.8
BUN/CREATININE RATIO: (no result)
BUN: 15
C-REACTIVE PROTEIN, QUANT: 9.8
CALCIUM: 9.6
CARBON DIOXIDE, TOTAL: 29
CHLORIDE: 105
CREATININE: 0.85
EGFR: 94
GLOBULIN, TOTAL: 2.3
GLUCOSE: 93
HEMATOCRIT: 52
HEMOGLOBIN: 17.1
INR: 1
MCH: 28.5
MCHC: 32.9
MCV: 86.7
MITOGEN-NIL: >10
MPV: 11.7
PLATELET COUNT: 196
POTASSIUM: 4.7
PROTEIN, TOTAL: 6.7
PT: 10.9
QUANTIFERON NIL VALUE: 0.03
QUANTIFERON TB1 AG VALUE: 0.08
QUANTIFERON TB2 AG VALUE: 0.03
QUANTIFERON-TB GOLD PLUS: NEGATIVE
RDW: 13.5
RED BLOOD CELL COUNT: 6
SODIUM: 144
WHITE BLOOD CELL COUNT: 8

## 2024-01-28 LAB — CALPROTECTIN, FECAL: 1060

## 2024-06-12 ENCOUNTER — TELEPHONE ENCOUNTER (OUTPATIENT)
Dept: URBAN - METROPOLITAN AREA CLINIC 115 | Facility: CLINIC | Age: 69
End: 2024-06-12

## 2024-06-14 ENCOUNTER — LAB OUTSIDE AN ENCOUNTER (OUTPATIENT)
Dept: URBAN - METROPOLITAN AREA CLINIC 115 | Facility: CLINIC | Age: 69
End: 2024-06-14

## 2024-08-12 ENCOUNTER — OFFICE VISIT (OUTPATIENT)
Dept: URBAN - METROPOLITAN AREA MEDICAL CENTER 24 | Facility: MEDICAL CENTER | Age: 69
End: 2024-08-12

## 2024-08-16 ENCOUNTER — OFFICE VISIT (OUTPATIENT)
Dept: URBAN - METROPOLITAN AREA SURGERY CENTER 13 | Facility: SURGERY CENTER | Age: 69
End: 2024-08-16

## 2024-08-16 RX ORDER — ATORVASTATIN CALCIUM 40 MG/1
1 TABLET TABLET, FILM COATED ORAL ONCE A DAY
Status: ACTIVE | COMMUNITY

## 2024-08-16 RX ORDER — CARVEDILOL 3.12 MG/1
1 TABLET WITH FOOD TABLET, FILM COATED ORAL TWICE A DAY
Status: ACTIVE | COMMUNITY

## 2024-08-16 RX ORDER — FINASTERIDE 5 MG/1
1 TABLET TABLET, FILM COATED ORAL ONCE A DAY
Status: ACTIVE | COMMUNITY

## 2024-08-16 RX ORDER — EMPAGLIFLOZIN 10 MG/1
1 TABLET TABLET, FILM COATED ORAL ONCE A DAY
Status: ACTIVE | COMMUNITY

## 2024-08-16 RX ORDER — USTEKINUMAB 90 MG/ML
90 MG INJECTION, SOLUTION SUBCUTANEOUS
Qty: 1 | Refills: 6 | Status: ACTIVE | COMMUNITY
Start: 2023-06-14 | End: 2025-03-13

## 2024-08-26 ENCOUNTER — TELEPHONE ENCOUNTER (OUTPATIENT)
Dept: URBAN - METROPOLITAN AREA CLINIC 82 | Facility: CLINIC | Age: 69
End: 2024-08-26

## 2024-09-03 ENCOUNTER — TELEPHONE ENCOUNTER (OUTPATIENT)
Dept: URBAN - METROPOLITAN AREA CLINIC 115 | Facility: CLINIC | Age: 69
End: 2024-09-03

## 2024-09-03 RX ORDER — USTEKINUMAB 90 MG/ML
90 MG INJECTION, SOLUTION SUBCUTANEOUS
Qty: 1 | Refills: 6
Start: 2023-06-14 | End: 2025-10-28

## 2024-09-04 ENCOUNTER — OFFICE VISIT (OUTPATIENT)
Dept: URBAN - METROPOLITAN AREA CLINIC 82 | Facility: CLINIC | Age: 69
End: 2024-09-04
Payer: MEDICARE

## 2024-09-04 ENCOUNTER — LAB OUTSIDE AN ENCOUNTER (OUTPATIENT)
Dept: URBAN - METROPOLITAN AREA CLINIC 82 | Facility: CLINIC | Age: 69
End: 2024-09-04

## 2024-09-04 VITALS
TEMPERATURE: 97.1 F | WEIGHT: 197 LBS | DIASTOLIC BLOOD PRESSURE: 57 MMHG | HEART RATE: 85 BPM | HEIGHT: 68 IN | SYSTOLIC BLOOD PRESSURE: 101 MMHG | BODY MASS INDEX: 29.86 KG/M2

## 2024-09-04 DIAGNOSIS — K74.69 OTHER CIRRHOSIS OF LIVER: ICD-10-CM

## 2024-09-04 DIAGNOSIS — K50.00 CROHN'S DISEASE OF SMALL INTESTINE WITHOUT COMPLICATION: ICD-10-CM

## 2024-09-04 PROCEDURE — 99213 OFFICE O/P EST LOW 20 MIN: CPT | Performed by: INTERNAL MEDICINE

## 2024-09-04 RX ORDER — RISANKIZUMAB-RZAA 360 MG/2.4
360MG, START AT WEEK 12 WEARABLE INJECTOR SUBCUTANEOUS
Qty: 1 | Refills: 6 | OUTPATIENT
Start: 2024-09-04 | End: 2025-10-29

## 2024-09-04 RX ORDER — USTEKINUMAB 90 MG/ML
90 MG INJECTION, SOLUTION SUBCUTANEOUS
Qty: 1 | Refills: 6 | Status: DISCONTINUED | COMMUNITY
Start: 2023-06-14 | End: 2025-10-28

## 2024-09-04 RX ORDER — ASPIRIN 81 MG/1
1 TABLET TABLET, COATED ORAL ONCE A DAY
Status: ACTIVE | COMMUNITY

## 2024-09-04 RX ORDER — LISINOPRIL 5 MG/1
1 TABLET TABLET ORAL ONCE A DAY
Status: ACTIVE | COMMUNITY

## 2024-09-04 RX ORDER — CARVEDILOL 3.12 MG/1
1 TABLET WITH FOOD TABLET, FILM COATED ORAL TWICE A DAY
Status: ACTIVE | COMMUNITY

## 2024-09-04 RX ORDER — ATORVASTATIN CALCIUM 40 MG/1
1 TABLET TABLET, FILM COATED ORAL ONCE A DAY
Status: ACTIVE | COMMUNITY

## 2024-09-04 RX ORDER — RISANKIZUMAB-RZAA 60 MG/ML
AS DIRECTED INJECTION INTRAVENOUS
Qty: 600 | Refills: 0 | OUTPATIENT
Start: 2024-09-04

## 2024-09-04 RX ORDER — EMPAGLIFLOZIN 10 MG/1
1 TABLET TABLET, FILM COATED ORAL ONCE A DAY
Status: ACTIVE | COMMUNITY

## 2024-09-04 RX ORDER — FINASTERIDE 5 MG/1
1 TABLET TABLET, FILM COATED ORAL ONCE A DAY
Status: DISCONTINUED | COMMUNITY

## 2024-09-04 NOTE — HPI-TODAY'S VISIT:
Colonoscopy '24 w mild-mod Crohns ileitis/ulcerations, grossly nml R/L colon and rectum, path with chronic colitis, no active colitis. Prior hx 1/2024: Labs stable and RUQ U/S w no hcc. alt loose and hard stools. Stelara infusion 7/2023, doesnt always receive stelara every 2 months with [facility], not sure if has to call in the refill every time. Prior hx: Colonoscopy '23 w moderate chronic crohns ileitis, normal colon mucosa. Prior hx: Pain resolved. Occ loose stool, only if drinks coffee, otherwise nml. CT abd/pel w concern for distal and TI inflammation, cirrhotic liver. Normal Cr/Bili/INR. Prior hx: R periumb pains, intermittent. hx spinal stenosis. bending can worsen. on PPI daily, occ nsaids. Prior hx: RUQ U/S 4/2022 w hepatosplenomegaly, small ascites. EGD '22 w gastritis, no hpylori, no varices. Colonoscopy '22 w int hemorrhoids, diverticulosis, small adenoma, rec repeat in 5 years. s/p iron infusions, hgb 10.2. Prior hx: reports hx cryptogenic cirrhosis, denies etoh.  recent Chest CT, [facility], sm R pleural effusion, s/p ICD, new upper abd ascites and equivocal changes of cirrhosis. omep 20 daily.

## 2024-09-13 ENCOUNTER — TELEPHONE ENCOUNTER (OUTPATIENT)
Dept: URBAN - METROPOLITAN AREA CLINIC 97 | Facility: CLINIC | Age: 69
End: 2024-09-13

## 2024-09-13 LAB
A/G RATIO: 1.6
ALBUMIN: 4.2
ALKALINE PHOSPHATASE: 91
ALT (SGPT): 21
AST (SGOT): 19
BILIRUBIN, TOTAL: 0.6
BUN/CREATININE RATIO: (no result)
BUN: 15
C-REACTIVE PROTEIN, QUANT: 10.1
CALCIUM: 9.4
CARBON DIOXIDE, TOTAL: 26
CHLORIDE: 105
CREATININE: 0.96
EGFR: 86
GLOBULIN, TOTAL: 2.6
GLUCOSE: 122
HEMATOCRIT: 51.1
HEMOGLOBIN: 16.5
INR: 1.1
MCH: 28.5
MCHC: 32.3
MCV: 88.3
MPV: 11.8
PLATELET COUNT: 211
POTASSIUM: 4.4
PROTEIN, TOTAL: 6.8
PT: 11.2
RDW: 13.4
RED BLOOD CELL COUNT: 5.79
SODIUM: 142
WHITE BLOOD CELL COUNT: 8.3

## 2024-11-20 ENCOUNTER — TELEPHONE ENCOUNTER (OUTPATIENT)
Dept: URBAN - METROPOLITAN AREA CLINIC 6 | Facility: CLINIC | Age: 69
End: 2024-11-20

## 2024-12-02 ENCOUNTER — OFFICE VISIT (OUTPATIENT)
Dept: URBAN - METROPOLITAN AREA CLINIC 114 | Facility: CLINIC | Age: 69
End: 2024-12-02
Payer: MEDICARE

## 2024-12-02 VITALS
RESPIRATION RATE: 20 BRPM | BODY MASS INDEX: 29.31 KG/M2 | HEIGHT: 68 IN | WEIGHT: 193.4 LBS | SYSTOLIC BLOOD PRESSURE: 143 MMHG | DIASTOLIC BLOOD PRESSURE: 67 MMHG | TEMPERATURE: 97.7 F | HEART RATE: 77 BPM

## 2024-12-02 DIAGNOSIS — K50.00 CROHN'S DISEASE OF SMALL INTESTINE WITHOUT COMPLICATION: ICD-10-CM

## 2024-12-02 PROCEDURE — 96413 CHEMO IV INFUSION 1 HR: CPT | Performed by: INTERNAL MEDICINE

## 2024-12-02 RX ORDER — LISINOPRIL 5 MG/1
1 TABLET TABLET ORAL ONCE A DAY
Status: ACTIVE | COMMUNITY

## 2024-12-02 RX ORDER — RISANKIZUMAB-RZAA 360 MG/2.4
360MG, START AT WEEK 12 WEARABLE INJECTOR SUBCUTANEOUS
Qty: 1 | Refills: 6 | Status: ACTIVE | COMMUNITY
Start: 2024-09-04 | End: 2025-10-29

## 2024-12-02 RX ORDER — CARVEDILOL 3.12 MG/1
1 TABLET WITH FOOD TABLET, FILM COATED ORAL TWICE A DAY
Status: ACTIVE | COMMUNITY

## 2024-12-02 RX ORDER — RISANKIZUMAB-RZAA 60 MG/ML
AS DIRECTED INJECTION INTRAVENOUS
Qty: 600 | Refills: 0 | Status: ACTIVE | COMMUNITY
Start: 2024-09-04

## 2024-12-02 RX ORDER — EMPAGLIFLOZIN 10 MG/1
1 TABLET TABLET, FILM COATED ORAL ONCE A DAY
Status: ACTIVE | COMMUNITY

## 2024-12-02 RX ORDER — ATORVASTATIN CALCIUM 40 MG/1
1 TABLET TABLET, FILM COATED ORAL ONCE A DAY
Status: ACTIVE | COMMUNITY

## 2024-12-02 RX ORDER — ASPIRIN 81 MG/1
1 TABLET TABLET, COATED ORAL ONCE A DAY
Status: ACTIVE | COMMUNITY

## 2024-12-11 ENCOUNTER — OFFICE VISIT (OUTPATIENT)
Dept: URBAN - METROPOLITAN AREA CLINIC 82 | Facility: CLINIC | Age: 69
End: 2024-12-11
Payer: MEDICARE

## 2024-12-11 VITALS
DIASTOLIC BLOOD PRESSURE: 60 MMHG | HEIGHT: 68 IN | WEIGHT: 198.2 LBS | BODY MASS INDEX: 30.04 KG/M2 | HEART RATE: 67 BPM | SYSTOLIC BLOOD PRESSURE: 97 MMHG | TEMPERATURE: 98.2 F

## 2024-12-11 DIAGNOSIS — K50.00 CROHN'S DISEASE OF SMALL INTESTINE WITHOUT COMPLICATION: ICD-10-CM

## 2024-12-11 DIAGNOSIS — K74.60 CIRRHOSIS OF LIVER WITHOUT ASCITES, UNSPECIFIED HEPATIC CIRRHOSIS TYPE: ICD-10-CM

## 2024-12-11 PROCEDURE — 99212 OFFICE O/P EST SF 10 MIN: CPT | Performed by: INTERNAL MEDICINE

## 2024-12-11 RX ORDER — RISANKIZUMAB-RZAA 360 MG/2.4
360MG, START AT WEEK 12 WEARABLE INJECTOR SUBCUTANEOUS
Qty: 1 | Refills: 6 | Status: ACTIVE | COMMUNITY
Start: 2024-09-04 | End: 2025-10-29

## 2024-12-11 RX ORDER — EMPAGLIFLOZIN 10 MG/1
1 TABLET TABLET, FILM COATED ORAL ONCE A DAY
Status: ACTIVE | COMMUNITY

## 2024-12-11 RX ORDER — ATORVASTATIN CALCIUM 40 MG/1
1 TABLET TABLET, FILM COATED ORAL ONCE A DAY
Status: ACTIVE | COMMUNITY

## 2024-12-11 RX ORDER — CARVEDILOL 3.12 MG/1
1 TABLET WITH FOOD TABLET, FILM COATED ORAL TWICE A DAY
Status: ACTIVE | COMMUNITY

## 2024-12-11 RX ORDER — ASPIRIN 81 MG/1
1 TABLET TABLET, COATED ORAL ONCE A DAY
Status: ACTIVE | COMMUNITY

## 2024-12-11 RX ORDER — LISINOPRIL 5 MG/1
1 TABLET TABLET ORAL ONCE A DAY
Status: ACTIVE | COMMUNITY

## 2024-12-11 RX ORDER — RISANKIZUMAB-RZAA 60 MG/ML
AS DIRECTED INJECTION INTRAVENOUS
Qty: 600 | Refills: 0 | Status: ACTIVE | COMMUNITY
Start: 2024-09-04

## 2024-12-11 NOTE — HPI-TODAY'S VISIT:
CRP elevated, other labs normal. Skyrizi started 12/2024. U/S stable cirrhosis 9/2024. occ hard stool. Prior hx 9/2024: Colonoscopy '24 w mild-mod Crohns ileitis/ulcerations, grossly nml R/L colon and rectum, path with chronic colitis, no active colitis. Prior hx 1/2024: Labs stable and RUQ U/S w no hcc. alt loose and hard stools. Stelara infusion 7/2023, doesnt always receive stelara every 2 months with [facility], not sure if has to call in the refill every time. Prior hx: Colonoscopy '23 w moderate chronic crohns ileitis, normal colon mucosa. Prior hx: Pain resolved. Occ loose stool, only if drinks coffee, otherwise nml. CT abd/pel w concern for distal and TI inflammation, cirrhotic liver. Normal Cr/Bili/INR. Prior hx: R periumb pains, intermittent. hx spinal stenosis. bending can worsen. on PPI daily, occ nsaids. Prior hx: RUQ U/S 4/2022 w hepatosplenomegaly, small ascites. EGD '22 w gastritis, no hpylori, no varices. Colonoscopy '22 w int hemorrhoids, diverticulosis, small adenoma, rec repeat in 5 years. s/p iron infusions, hgb 10.2. Prior hx: reports hx cryptogenic cirrhosis, denies etoh.  recent Chest CT, [facility], sm R pleural effusion, s/p ICD, new upper abd ascites and equivocal changes of cirrhosis. omep 20 daily.

## 2025-01-06 ENCOUNTER — OFFICE VISIT (OUTPATIENT)
Dept: URBAN - METROPOLITAN AREA CLINIC 114 | Facility: CLINIC | Age: 70
End: 2025-01-06

## 2025-01-15 ENCOUNTER — OFFICE VISIT (OUTPATIENT)
Dept: URBAN - METROPOLITAN AREA CLINIC 114 | Facility: CLINIC | Age: 70
End: 2025-01-15
Payer: MEDICARE

## 2025-01-15 VITALS
SYSTOLIC BLOOD PRESSURE: 115 MMHG | WEIGHT: 193.8 LBS | DIASTOLIC BLOOD PRESSURE: 60 MMHG | BODY MASS INDEX: 29.37 KG/M2 | HEIGHT: 68 IN | TEMPERATURE: 97.5 F

## 2025-01-15 DIAGNOSIS — K50.90 CROHN'S DISEASE: ICD-10-CM

## 2025-01-15 PROCEDURE — 96413 CHEMO IV INFUSION 1 HR: CPT | Performed by: INTERNAL MEDICINE

## 2025-01-15 RX ORDER — EMPAGLIFLOZIN 10 MG/1
1 TABLET TABLET, FILM COATED ORAL ONCE A DAY
Status: ACTIVE | COMMUNITY

## 2025-01-15 RX ORDER — LISINOPRIL 5 MG/1
1 TABLET TABLET ORAL ONCE A DAY
Status: ACTIVE | COMMUNITY

## 2025-01-15 RX ORDER — ASPIRIN 81 MG/1
1 TABLET TABLET, COATED ORAL ONCE A DAY
Status: ACTIVE | COMMUNITY

## 2025-01-15 RX ORDER — RISANKIZUMAB-RZAA 360 MG/2.4
360MG, START AT WEEK 12 WEARABLE INJECTOR SUBCUTANEOUS
Qty: 1 | Refills: 6 | Status: ACTIVE | COMMUNITY
Start: 2024-09-04 | End: 2025-10-29

## 2025-01-15 RX ORDER — ATORVASTATIN CALCIUM 40 MG/1
1 TABLET TABLET, FILM COATED ORAL ONCE A DAY
Status: ACTIVE | COMMUNITY

## 2025-01-15 RX ORDER — CARVEDILOL 3.12 MG/1
1 TABLET WITH FOOD TABLET, FILM COATED ORAL TWICE A DAY
Status: ACTIVE | COMMUNITY

## 2025-01-15 RX ORDER — RISANKIZUMAB-RZAA 60 MG/ML
AS DIRECTED INJECTION INTRAVENOUS
Qty: 600 | Refills: 0 | Status: ACTIVE | COMMUNITY
Start: 2024-09-04

## 2025-02-10 ENCOUNTER — OFFICE VISIT (OUTPATIENT)
Dept: URBAN - METROPOLITAN AREA CLINIC 114 | Facility: CLINIC | Age: 70
End: 2025-02-10

## 2025-02-10 ENCOUNTER — TELEPHONE ENCOUNTER (OUTPATIENT)
Dept: URBAN - METROPOLITAN AREA CLINIC 6 | Facility: CLINIC | Age: 70
End: 2025-02-10

## 2025-02-10 PROBLEM — 19943007: Status: ACTIVE | Noted: 2025-02-10

## 2025-02-11 ENCOUNTER — OFFICE VISIT (OUTPATIENT)
Dept: URBAN - METROPOLITAN AREA CLINIC 114 | Facility: CLINIC | Age: 70
End: 2025-02-11
Payer: MEDICARE

## 2025-02-11 VITALS
DIASTOLIC BLOOD PRESSURE: 57 MMHG | HEIGHT: 68 IN | HEART RATE: 61 BPM | TEMPERATURE: 97.7 F | SYSTOLIC BLOOD PRESSURE: 111 MMHG | WEIGHT: 193 LBS | BODY MASS INDEX: 29.25 KG/M2 | RESPIRATION RATE: 20 BRPM

## 2025-02-11 DIAGNOSIS — K50.00 CROHN'S DISEASE OF SMALL INTESTINE WITHOUT COMPLICATION: ICD-10-CM

## 2025-02-11 PROCEDURE — 96413 CHEMO IV INFUSION 1 HR: CPT | Performed by: INTERNAL MEDICINE

## 2025-02-11 RX ORDER — LISINOPRIL 5 MG/1
1 TABLET TABLET ORAL ONCE A DAY
Status: ACTIVE | COMMUNITY

## 2025-02-11 RX ORDER — EMPAGLIFLOZIN 10 MG/1
1 TABLET TABLET, FILM COATED ORAL ONCE A DAY
Status: ACTIVE | COMMUNITY

## 2025-02-11 RX ORDER — RISANKIZUMAB-RZAA 60 MG/ML
AS DIRECTED INJECTION INTRAVENOUS
Qty: 600 | Refills: 0 | Status: ACTIVE | COMMUNITY
Start: 2024-09-04

## 2025-02-11 RX ORDER — ASPIRIN 81 MG/1
1 TABLET TABLET, COATED ORAL ONCE A DAY
Status: ACTIVE | COMMUNITY

## 2025-02-11 RX ORDER — ATORVASTATIN CALCIUM 40 MG/1
1 TABLET TABLET, FILM COATED ORAL ONCE A DAY
Status: ACTIVE | COMMUNITY

## 2025-02-11 RX ORDER — CARVEDILOL 3.12 MG/1
1 TABLET WITH FOOD TABLET, FILM COATED ORAL TWICE A DAY
Status: ACTIVE | COMMUNITY

## 2025-02-11 RX ORDER — RISANKIZUMAB-RZAA 360 MG/2.4
360MG, START AT WEEK 12 WEARABLE INJECTOR SUBCUTANEOUS
Qty: 1 | Refills: 6 | Status: ACTIVE | COMMUNITY
Start: 2024-09-04 | End: 2025-10-29

## 2025-02-12 ENCOUNTER — TELEPHONE ENCOUNTER (OUTPATIENT)
Dept: URBAN - METROPOLITAN AREA CLINIC 82 | Facility: CLINIC | Age: 70
End: 2025-02-12

## 2025-02-16 LAB
ALBUMIN/GLOBULIN RATIO: 1.9
ALBUMIN: 4.1
ALKALINE PHOSPHATASE: 89
ALT: 24
AST: 20
BILIRUBIN, TOTAL: 0.8
BUN/CREATININE RATIO: (no result)
CALCIUM: 9.1
CARBON DIOXIDE: 30
CHLORIDE: 103
CREATININE: 0.88
GLOBULIN: 2.2
GLUCOSE: 105
HEMATOCRIT: 45.9
HEMOGLOBIN: 15
INR: 1.1
MCH: 28.7
MCHC: 32.7
MCV: 87.8
MITOGEN-NIL: 8.35
MPV: 12.1
PLATELET COUNT: 196
POTASSIUM: 4.6
PROTEIN, TOTAL: 6.3
PT: 11.3
QUANTIFERON NIL VALUE: 0.06
QUANTIFERON TB1 AG VALUE: 0.05
QUANTIFERON TB2 AG VALUE: 0.04
QUANTIFERON-TB GOLD PLUS: NEGATIVE
RDW: 13.2
RED BLOOD CELL COUNT: 5.23
SODIUM: 140
UREA NITROGEN (BUN): 12
WHITE BLOOD CELL COUNT: 7.3

## 2025-03-10 ENCOUNTER — LAB OUTSIDE AN ENCOUNTER (OUTPATIENT)
Dept: URBAN - METROPOLITAN AREA CLINIC 82 | Facility: CLINIC | Age: 70
End: 2025-03-10

## 2025-03-10 ENCOUNTER — OFFICE VISIT (OUTPATIENT)
Dept: URBAN - METROPOLITAN AREA CLINIC 82 | Facility: CLINIC | Age: 70
End: 2025-03-10
Payer: MEDICARE

## 2025-03-10 VITALS
TEMPERATURE: 98.1 F | SYSTOLIC BLOOD PRESSURE: 110 MMHG | HEART RATE: 60 BPM | BODY MASS INDEX: 28.7 KG/M2 | WEIGHT: 189.4 LBS | HEIGHT: 68 IN | DIASTOLIC BLOOD PRESSURE: 56 MMHG

## 2025-03-10 DIAGNOSIS — K50.00 CROHN'S DISEASE OF SMALL INTESTINE WITHOUT COMPLICATION: ICD-10-CM

## 2025-03-10 DIAGNOSIS — K74.60 CIRRHOSIS OF LIVER WITHOUT ASCITES, UNSPECIFIED HEPATIC CIRRHOSIS TYPE: ICD-10-CM

## 2025-03-10 PROCEDURE — 99213 OFFICE O/P EST LOW 20 MIN: CPT | Performed by: INTERNAL MEDICINE

## 2025-03-10 RX ORDER — RISANKIZUMAB-RZAA 60 MG/ML
AS DIRECTED INJECTION INTRAVENOUS
Qty: 600 | Refills: 0 | Status: ACTIVE | COMMUNITY
Start: 2024-09-04

## 2025-03-10 RX ORDER — ATORVASTATIN CALCIUM 40 MG/1
1 TABLET TABLET, FILM COATED ORAL ONCE A DAY
Status: ACTIVE | COMMUNITY

## 2025-03-10 RX ORDER — RISANKIZUMAB-RZAA 360 MG/2.4
360MG, START AT WEEK 12 WEARABLE INJECTOR SUBCUTANEOUS
Qty: 1 | Refills: 6 | Status: ACTIVE | COMMUNITY
Start: 2024-09-04 | End: 2025-10-29

## 2025-03-10 RX ORDER — PREGABALIN 75 MG/1
1 CAPSULE CAPSULE ORAL ONCE A DAY
Status: ACTIVE | COMMUNITY

## 2025-03-10 RX ORDER — LISINOPRIL 5 MG/1
1 TABLET TABLET ORAL ONCE A DAY
Status: ACTIVE | COMMUNITY

## 2025-03-10 RX ORDER — CARVEDILOL 3.12 MG/1
1 TABLET WITH FOOD TABLET, FILM COATED ORAL TWICE A DAY
Status: ACTIVE | COMMUNITY

## 2025-03-10 RX ORDER — ASPIRIN 81 MG/1
1 TABLET TABLET, COATED ORAL ONCE A DAY
Status: ACTIVE | COMMUNITY

## 2025-03-10 RX ORDER — EMPAGLIFLOZIN 10 MG/1
1 TABLET TABLET, FILM COATED ORAL ONCE A DAY
Status: ACTIVE | COMMUNITY

## 2025-03-10 NOTE — HPI-TODAY'S VISIT:
Normal stools, last stool of day occ loose. Overall better. CRP elevated, other labs normal. Skyrizi started 12/2024. U/S stable cirrhosis 9/2024.Prior hx 9/2024: Colonoscopy '24 w mild-mod Crohns ileitis/ulcerations, grossly nml R/L colon and rectum, path with chronic colitis, no active colitis. Prior hx 1/2024: Labs stable and RUQ U/S w no hcc. alt loose and hard stools. Stelara infusion 7/2023, doesnt always receive stelara every 2 months with [facility], not sure if has to call in the refill every time. Prior hx: Colonoscopy '23 w moderate chronic crohns ileitis, normal colon mucosa. Prior hx: Pain resolved. Occ loose stool, only if drinks coffee, otherwise nml. CT abd/pel w concern for distal and TI inflammation, cirrhotic liver. Normal Cr/Bili/INR. Prior hx: R periumb pains, intermittent. hx spinal stenosis. bending can worsen. on PPI daily, occ nsaids. Prior hx: RUQ U/S 4/2022 w hepatosplenomegaly, small ascites. EGD '22 w gastritis, no hpylori, no varices. Colonoscopy '22 w int hemorrhoids, diverticulosis, small adenoma, rec repeat in 5 years. s/p iron infusions, hgb 10.2. Prior hx: reports hx cryptogenic cirrhosis, denies etoh.  recent Chest CT, [facility], sm R pleural effusion, s/p ICD, new upper abd ascites and equivocal changes of cirrhosis. omep 20 daily.

## 2025-03-14 LAB
A/G RATIO: 2
ALBUMIN: 4.4
ALKALINE PHOSPHATASE: 80
ALT (SGPT): 18
AST (SGOT): 20
BILIRUBIN, TOTAL: 0.8
BUN/CREATININE RATIO: (no result)
BUN: 12
C-REACTIVE PROTEIN, QUANT: 7.7
CALCIUM: 9.4
CARBON DIOXIDE, TOTAL: 32
CHLORIDE: 102
CREATININE: 0.83
EGFR: 94
GLOBULIN, TOTAL: 2.2
GLUCOSE: 83
HEMATOCRIT: 48
HEMOGLOBIN: 15.6
INR: 1.1
MCH: 28.7
MCHC: 32.5
MCV: 88.4
MITOGEN-NIL: 4.86
MPV: 12.1
PLATELET COUNT: 188
POTASSIUM: 4.5
PROTEIN, TOTAL: 6.6
PT: 11.2
QUANTIFERON NIL VALUE: 0.03
QUANTIFERON TB1 AG VALUE: 0.03
QUANTIFERON TB2 AG VALUE: 0.07
QUANTIFERON-TB GOLD PLUS: NEGATIVE
RDW: 13.4
RED BLOOD CELL COUNT: 5.43
SODIUM: 142
WHITE BLOOD CELL COUNT: 7

## 2025-03-19 LAB — CALPROTECTIN, FECAL: 1290

## 2025-04-25 ENCOUNTER — OFFICE VISIT (OUTPATIENT)
Dept: URBAN - METROPOLITAN AREA CLINIC 81 | Facility: CLINIC | Age: 70
End: 2025-04-25

## 2025-06-16 ENCOUNTER — OFFICE VISIT (OUTPATIENT)
Dept: URBAN - METROPOLITAN AREA CLINIC 82 | Facility: CLINIC | Age: 70
End: 2025-06-16
Payer: MEDICARE

## 2025-06-16 ENCOUNTER — LAB OUTSIDE AN ENCOUNTER (OUTPATIENT)
Dept: URBAN - METROPOLITAN AREA CLINIC 82 | Facility: CLINIC | Age: 70
End: 2025-06-16

## 2025-06-16 DIAGNOSIS — K74.60 CIRRHOSIS OF LIVER WITHOUT ASCITES, UNSPECIFIED HEPATIC CIRRHOSIS TYPE: ICD-10-CM

## 2025-06-16 DIAGNOSIS — K58.1 IRRITABLE BOWEL SYNDROME WITH CONSTIPATION: ICD-10-CM

## 2025-06-16 DIAGNOSIS — K50.00 CROHN'S DISEASE OF SMALL INTESTINE WITHOUT COMPLICATION: ICD-10-CM

## 2025-06-16 DIAGNOSIS — Z95.810 ICD (IMPLANTABLE CARDIOVERTER-DEFIBRILLATOR) IN PLACE: ICD-10-CM

## 2025-06-16 PROBLEM — 440630006: Status: ACTIVE | Noted: 2025-06-16

## 2025-06-16 PROCEDURE — 99213 OFFICE O/P EST LOW 20 MIN: CPT | Performed by: INTERNAL MEDICINE

## 2025-06-16 RX ORDER — EMPAGLIFLOZIN 10 MG/1
1 TABLET TABLET, FILM COATED ORAL ONCE A DAY
Status: ACTIVE | COMMUNITY

## 2025-06-16 RX ORDER — RISANKIZUMAB-RZAA 360 MG/2.4
360MG, START AT WEEK 12 WEARABLE INJECTOR SUBCUTANEOUS
Qty: 1 | Refills: 6 | Status: ACTIVE | COMMUNITY
Start: 2024-09-04 | End: 2025-10-29

## 2025-06-16 RX ORDER — ASPIRIN 81 MG/1
1 TABLET TABLET, COATED ORAL ONCE A DAY
Status: ACTIVE | COMMUNITY

## 2025-06-16 RX ORDER — LISINOPRIL 5 MG/1
1 TABLET TABLET ORAL ONCE A DAY
Status: ACTIVE | COMMUNITY

## 2025-06-16 RX ORDER — ATORVASTATIN CALCIUM 40 MG/1
1 TABLET TABLET, FILM COATED ORAL ONCE A DAY
Status: ACTIVE | COMMUNITY

## 2025-06-16 RX ORDER — PREGABALIN 75 MG/1
1 CAPSULE CAPSULE ORAL ONCE A DAY
Status: ACTIVE | COMMUNITY

## 2025-06-16 RX ORDER — CARVEDILOL 3.12 MG/1
1 TABLET WITH FOOD TABLET, FILM COATED ORAL TWICE A DAY
Status: ACTIVE | COMMUNITY

## 2025-06-16 RX ORDER — RISANKIZUMAB-RZAA 60 MG/ML
AS DIRECTED INJECTION INTRAVENOUS
Qty: 600 | Refills: 0 | Status: ACTIVE | COMMUNITY
Start: 2024-09-04

## 2025-06-16 NOTE — HPI-TODAY'S VISIT:
mild constipation. labs normal. RUQ U/S 3/2025 stable cirrhosis. Skyrizi started 12/2024, diarrhea resolved. Prior hx 9/2024: Colonoscopy '24 w mild-mod Crohns ileitis/ulcerations, grossly nml R/L colon and rectum, path with chronic colitis, no active colitis. Prior hx 1/2024: Labs stable and RUQ U/S w no hcc. alt loose and hard stools. Stelara infusion 7/2023, doesnt always receive stelara every 2 months with [facility], not sure if has to call in the refill every time. Prior hx: Colonoscopy '23 w moderate chronic crohns ileitis, normal colon mucosa. Prior hx: Pain resolved. Occ loose stool, only if drinks coffee, otherwise nml. CT abd/pel w concern for distal and TI inflammation, cirrhotic liver. Normal Cr/Bili/INR. Prior hx: R periumb pains, intermittent. hx spinal stenosis. bending can worsen. on PPI daily, occ nsaids. Prior hx: RUQ U/S 4/2022 w hepatosplenomegaly, small ascites. EGD '22 w gastritis, no hpylori, no varices. Colonoscopy '22 w int hemorrhoids, diverticulosis, small adenoma, rec repeat in 5 years. s/p iron infusions, hgb 10.2. Prior hx: reports hx cryptogenic cirrhosis, denies etoh.  recent Chest CT, [facility], sm R pleural effusion, s/p ICD, new upper abd ascites and equivocal changes of cirrhosis. omep 20 daily.